# Patient Record
Sex: FEMALE | Race: WHITE | Employment: FULL TIME | URBAN - METROPOLITAN AREA
[De-identification: names, ages, dates, MRNs, and addresses within clinical notes are randomized per-mention and may not be internally consistent; named-entity substitution may affect disease eponyms.]

---

## 2022-11-10 ENCOUNTER — APPOINTMENT (OUTPATIENT)
Dept: CT IMAGING | Age: 27
End: 2022-11-10
Payer: MEDICAID

## 2022-11-10 ENCOUNTER — APPOINTMENT (OUTPATIENT)
Dept: ULTRASOUND IMAGING | Age: 27
End: 2022-11-10
Payer: MEDICAID

## 2022-11-10 ENCOUNTER — APPOINTMENT (OUTPATIENT)
Dept: GENERAL RADIOLOGY | Age: 27
End: 2022-11-10
Payer: MEDICAID

## 2022-11-10 ENCOUNTER — HOSPITAL ENCOUNTER (OUTPATIENT)
Age: 27
Setting detail: OBSERVATION
Discharge: HOME | End: 2022-11-11
Attending: EMERGENCY MEDICINE | Admitting: STUDENT IN AN ORGANIZED HEALTH CARE EDUCATION/TRAINING PROGRAM
Payer: MEDICAID

## 2022-11-10 DIAGNOSIS — I26.99 ACUTE PULMONARY EMBOLISM, UNSPECIFIED PULMONARY EMBOLISM TYPE, UNSPECIFIED WHETHER ACUTE COR PULMONALE PRESENT (HCC): Primary | ICD-10-CM

## 2022-11-10 LAB
ALBUMIN SERPL-MCNC: 4.4 G/DL (ref 3.5–5.2)
ALP BLD-CCNC: 44 U/L (ref 35–104)
ALT SERPL-CCNC: 10 U/L (ref 0–32)
ANION GAP SERPL CALCULATED.3IONS-SCNC: 10 MMOL/L (ref 7–16)
AST SERPL-CCNC: 18 U/L (ref 0–31)
BASOPHILS ABSOLUTE: 0.03 E9/L (ref 0–0.2)
BASOPHILS RELATIVE PERCENT: 0.4 % (ref 0–2)
BILIRUB SERPL-MCNC: 0.5 MG/DL (ref 0–1.2)
BUN BLDV-MCNC: 11 MG/DL (ref 6–20)
CALCIUM SERPL-MCNC: 9.2 MG/DL (ref 8.6–10.2)
CHLORIDE BLD-SCNC: 104 MMOL/L (ref 98–107)
CO2: 23 MMOL/L (ref 22–29)
CREAT SERPL-MCNC: 0.8 MG/DL (ref 0.5–1)
D DIMER: 314 NG/ML DDU
EKG ATRIAL RATE: 76 BPM
EKG P AXIS: 57 DEGREES
EKG P-R INTERVAL: 134 MS
EKG Q-T INTERVAL: 402 MS
EKG QRS DURATION: 90 MS
EKG QTC CALCULATION (BAZETT): 452 MS
EKG R AXIS: 37 DEGREES
EKG T AXIS: 33 DEGREES
EKG VENTRICULAR RATE: 76 BPM
EOSINOPHILS ABSOLUTE: 0.1 E9/L (ref 0.05–0.5)
EOSINOPHILS RELATIVE PERCENT: 1.4 % (ref 0–6)
GFR SERPL CREATININE-BSD FRML MDRD: >60 ML/MIN/1.73
GLUCOSE BLD-MCNC: 88 MG/DL (ref 74–99)
HCG, URINE, POC: NEGATIVE
HCT VFR BLD CALC: 40.4 % (ref 34–48)
HEMOGLOBIN: 13.4 G/DL (ref 11.5–15.5)
IMMATURE GRANULOCYTES #: 0.02 E9/L
IMMATURE GRANULOCYTES %: 0.3 % (ref 0–5)
LIPASE: 17 U/L (ref 13–60)
LYMPHOCYTES ABSOLUTE: 1.83 E9/L (ref 1.5–4)
LYMPHOCYTES RELATIVE PERCENT: 24.9 % (ref 20–42)
Lab: NORMAL
MCH RBC QN AUTO: 31.3 PG (ref 26–35)
MCHC RBC AUTO-ENTMCNC: 33.2 % (ref 32–34.5)
MCV RBC AUTO: 94.4 FL (ref 80–99.9)
MONOCYTES ABSOLUTE: 0.58 E9/L (ref 0.1–0.95)
MONOCYTES RELATIVE PERCENT: 7.9 % (ref 2–12)
NEGATIVE QC PASS/FAIL: NORMAL
NEUTROPHILS ABSOLUTE: 4.78 E9/L (ref 1.8–7.3)
NEUTROPHILS RELATIVE PERCENT: 65.1 % (ref 43–80)
PDW BLD-RTO: 12.4 FL (ref 11.5–15)
PLATELET # BLD: 262 E9/L (ref 130–450)
PMV BLD AUTO: 10.4 FL (ref 7–12)
POSITIVE QC PASS/FAIL: NORMAL
POTASSIUM SERPL-SCNC: 3.9 MMOL/L (ref 3.5–5)
RBC # BLD: 4.28 E12/L (ref 3.5–5.5)
SODIUM BLD-SCNC: 137 MMOL/L (ref 132–146)
TOTAL PROTEIN: 6.7 G/DL (ref 6.4–8.3)
TROPONIN, HIGH SENSITIVITY: <6 NG/L (ref 0–9)
WBC # BLD: 7.3 E9/L (ref 4.5–11.5)

## 2022-11-10 PROCEDURE — 71275 CT ANGIOGRAPHY CHEST: CPT

## 2022-11-10 PROCEDURE — 96372 THER/PROPH/DIAG INJ SC/IM: CPT

## 2022-11-10 PROCEDURE — 85378 FIBRIN DEGRADE SEMIQUANT: CPT

## 2022-11-10 PROCEDURE — 6360000002 HC RX W HCPCS: Performed by: NURSE PRACTITIONER

## 2022-11-10 PROCEDURE — 6360000002 HC RX W HCPCS: Performed by: STUDENT IN AN ORGANIZED HEALTH CARE EDUCATION/TRAINING PROGRAM

## 2022-11-10 PROCEDURE — 93010 ELECTROCARDIOGRAM REPORT: CPT | Performed by: INTERNAL MEDICINE

## 2022-11-10 PROCEDURE — G0378 HOSPITAL OBSERVATION PER HR: HCPCS

## 2022-11-10 PROCEDURE — 99285 EMERGENCY DEPT VISIT HI MDM: CPT

## 2022-11-10 PROCEDURE — 93970 EXTREMITY STUDY: CPT

## 2022-11-10 PROCEDURE — 99219 PR INITIAL OBSERVATION CARE/DAY 50 MINUTES: CPT | Performed by: NURSE PRACTITIONER

## 2022-11-10 PROCEDURE — 84484 ASSAY OF TROPONIN QUANT: CPT

## 2022-11-10 PROCEDURE — 71045 X-RAY EXAM CHEST 1 VIEW: CPT

## 2022-11-10 PROCEDURE — 83690 ASSAY OF LIPASE: CPT

## 2022-11-10 PROCEDURE — 93005 ELECTROCARDIOGRAM TRACING: CPT | Performed by: EMERGENCY MEDICINE

## 2022-11-10 PROCEDURE — 2580000003 HC RX 258: Performed by: NURSE PRACTITIONER

## 2022-11-10 PROCEDURE — 85025 COMPLETE CBC W/AUTO DIFF WBC: CPT

## 2022-11-10 PROCEDURE — 80053 COMPREHEN METABOLIC PANEL: CPT

## 2022-11-10 PROCEDURE — 6360000004 HC RX CONTRAST MEDICATION: Performed by: RADIOLOGY

## 2022-11-10 RX ORDER — ENOXAPARIN SODIUM 100 MG/ML
1 INJECTION SUBCUTANEOUS ONCE
Status: COMPLETED | OUTPATIENT
Start: 2022-11-10 | End: 2022-11-10

## 2022-11-10 RX ORDER — SODIUM CHLORIDE 9 MG/ML
INJECTION, SOLUTION INTRAVENOUS PRN
Status: DISCONTINUED | OUTPATIENT
Start: 2022-11-10 | End: 2022-11-11 | Stop reason: HOSPADM

## 2022-11-10 RX ORDER — SODIUM CHLORIDE 0.9 % (FLUSH) 0.9 %
5-40 SYRINGE (ML) INJECTION PRN
Status: DISCONTINUED | OUTPATIENT
Start: 2022-11-10 | End: 2022-11-11 | Stop reason: HOSPADM

## 2022-11-10 RX ORDER — ACETAMINOPHEN 650 MG/1
650 SUPPOSITORY RECTAL EVERY 6 HOURS PRN
Status: DISCONTINUED | OUTPATIENT
Start: 2022-11-10 | End: 2022-11-11 | Stop reason: HOSPADM

## 2022-11-10 RX ORDER — ONDANSETRON 2 MG/ML
4 INJECTION INTRAMUSCULAR; INTRAVENOUS EVERY 6 HOURS PRN
Status: DISCONTINUED | OUTPATIENT
Start: 2022-11-10 | End: 2022-11-11 | Stop reason: HOSPADM

## 2022-11-10 RX ORDER — ONDANSETRON 4 MG/1
4 TABLET, ORALLY DISINTEGRATING ORAL EVERY 8 HOURS PRN
Status: DISCONTINUED | OUTPATIENT
Start: 2022-11-10 | End: 2022-11-11 | Stop reason: HOSPADM

## 2022-11-10 RX ORDER — SODIUM CHLORIDE 0.9 % (FLUSH) 0.9 %
5-40 SYRINGE (ML) INJECTION EVERY 12 HOURS SCHEDULED
Status: DISCONTINUED | OUTPATIENT
Start: 2022-11-10 | End: 2022-11-11 | Stop reason: HOSPADM

## 2022-11-10 RX ORDER — POLYETHYLENE GLYCOL 3350 17 G/17G
17 POWDER, FOR SOLUTION ORAL DAILY PRN
Status: DISCONTINUED | OUTPATIENT
Start: 2022-11-10 | End: 2022-11-11 | Stop reason: HOSPADM

## 2022-11-10 RX ORDER — ENOXAPARIN SODIUM 100 MG/ML
1 INJECTION SUBCUTANEOUS 2 TIMES DAILY
Status: DISCONTINUED | OUTPATIENT
Start: 2022-11-10 | End: 2022-11-11

## 2022-11-10 RX ORDER — ACETAMINOPHEN 325 MG/1
650 TABLET ORAL EVERY 6 HOURS PRN
Status: DISCONTINUED | OUTPATIENT
Start: 2022-11-10 | End: 2022-11-11 | Stop reason: HOSPADM

## 2022-11-10 RX ADMIN — ENOXAPARIN SODIUM 80 MG: 100 INJECTION SUBCUTANEOUS at 14:47

## 2022-11-10 RX ADMIN — SODIUM CHLORIDE, PRESERVATIVE FREE 10 ML: 5 INJECTION INTRAVENOUS at 21:02

## 2022-11-10 RX ADMIN — IOPAMIDOL 75 ML: 755 INJECTION, SOLUTION INTRAVENOUS at 11:56

## 2022-11-10 RX ADMIN — ENOXAPARIN SODIUM 80 MG: 100 INJECTION SUBCUTANEOUS at 23:09

## 2022-11-10 ASSESSMENT — PAIN DESCRIPTION - ONSET: ONSET: PROGRESSIVE

## 2022-11-10 ASSESSMENT — PAIN - FUNCTIONAL ASSESSMENT
PAIN_FUNCTIONAL_ASSESSMENT: NONE - DENIES PAIN
PAIN_FUNCTIONAL_ASSESSMENT: 0-10
PAIN_FUNCTIONAL_ASSESSMENT: 0-10
PAIN_FUNCTIONAL_ASSESSMENT: PREVENTS OR INTERFERES WITH MANY ACTIVE NOT PASSIVE ACTIVITIES
PAIN_FUNCTIONAL_ASSESSMENT: NONE - DENIES PAIN
PAIN_FUNCTIONAL_ASSESSMENT: NONE - DENIES PAIN

## 2022-11-10 ASSESSMENT — PAIN DESCRIPTION - DESCRIPTORS: DESCRIPTORS: STABBING;SHARP

## 2022-11-10 ASSESSMENT — PAIN SCALES - GENERAL
PAINLEVEL_OUTOF10: 6
PAINLEVEL_OUTOF10: 0

## 2022-11-10 ASSESSMENT — ENCOUNTER SYMPTOMS
SHORTNESS OF BREATH: 1
ABDOMINAL PAIN: 0
BACK PAIN: 0

## 2022-11-10 ASSESSMENT — LIFESTYLE VARIABLES
HOW MANY STANDARD DRINKS CONTAINING ALCOHOL DO YOU HAVE ON A TYPICAL DAY: PATIENT DOES NOT DRINK
HOW OFTEN DO YOU HAVE A DRINK CONTAINING ALCOHOL: NEVER

## 2022-11-10 ASSESSMENT — PAIN DESCRIPTION - FREQUENCY: FREQUENCY: INTERMITTENT

## 2022-11-10 ASSESSMENT — PAIN DESCRIPTION - LOCATION: LOCATION: CHEST

## 2022-11-10 ASSESSMENT — PAIN DESCRIPTION - PAIN TYPE: TYPE: ACUTE PAIN

## 2022-11-10 NOTE — H&P
[facility]   History and Physical    CHIEF COMPLAINT: Palpitations, fatigue    History of Present Illness:  Elvira Zhang is a 27 y.o. female with a history of anxiety & low back pain who presents with Chest Pain, Dizziness, and Shortness of Breath  Patient states she has had intermittent palpitations for the last year. States it has been worked up, & she was told she has an 'arrhythmia'. Patient states palpitations have increased in frequency over the last several days & was severe today. She has also experienced excessive fatigue, peripheral edema & left lateral chest wall pain that worsens with a deep breath over the last few days. Patient denies recent travel or prolonged inactivity. Drove to [place] in early October, but states that he made frequent stops to stretch. States she is very active, works out daily. Has not been on any type of birth control since 12yo. Does not smoke cigarettes. States she uses medical marijuana very infrequently for anxiety, most recently 2-3 weeks ago. Denies history of clotting. Denies knowledge of family history of clotting. Does have a significant family history of cancers. Denies recent illness or injury. Denies CP, SOB. Pertinent ER findings include elevated D-dimer (314). CTA identified focal filling defect in distal of RUL compatible with acute PE. CXR negative for acute process. US bilat LE's pending. Urine pregnant negative. While in ER, patient received Lovenox 80 mg IM.     WORK UP SINCE ARRIVAL:  Results for orders placed or performed during the hospital encounter of 11/10/22   CMP   Result Value Ref Range    Sodium 137 132 - 146 mmol/L    Potassium 3.9 3.5 - 5.0 mmol/L    Chloride 104 98 - 107 mmol/L    CO2 23 22 - 29 mmol/L    Anion Gap 10 7 - 16 mmol/L    Glucose 88 74 - 99 mg/dL    BUN 11 6 - 20 mg/dL    Creatinine 0.8 0.5 - 1.0 mg/dL    Est, Glom Filt Rate >60 >=60 mL/min/1.73    Calcium 9.2 8.6 - 10.2 mg/dL Total Protein 6.7 6.4 - 8.3 g/dL    Albumin 4.4 3.5 - 5.2 g/dL    Total Bilirubin 0.5 0.0 - 1.2 mg/dL    Alkaline Phosphatase 44 35 - 104 U/L    ALT 10 0 - 32 U/L    AST 18 0 - 31 U/L   CBC with Auto Differential   Result Value Ref Range    WBC 7.3 4.5 - 11.5 E9/L    RBC 4.28 3.50 - 5.50 E12/L    Hemoglobin 13.4 11.5 - 15.5 g/dL    Hematocrit 40.4 34.0 - 48.0 %    MCV 94.4 80.0 - 99.9 fL    MCH 31.3 26.0 - 35.0 pg    MCHC 33.2 32.0 - 34.5 %    RDW 12.4 11.5 - 15.0 fL    Platelets 047 896 - 716 E9/L    MPV 10.4 7.0 - 12.0 fL    Neutrophils % 65.1 43.0 - 80.0 %    Immature Granulocytes % 0.3 0.0 - 5.0 %    Lymphocytes % 24.9 20.0 - 42.0 %    Monocytes % 7.9 2.0 - 12.0 %    Eosinophils % 1.4 0.0 - 6.0 %    Basophils % 0.4 0.0 - 2.0 %    Neutrophils Absolute 4.78 1.80 - 7.30 E9/L    Immature Granulocytes # 0.02 E9/L    Lymphocytes Absolute 1.83 1.50 - 4.00 E9/L    Monocytes Absolute 0.58 0.10 - 0.95 E9/L    Eosinophils Absolute 0.10 0.05 - 0.50 E9/L    Basophils Absolute 0.03 0.00 - 0.20 E9/L   Troponin   Result Value Ref Range    Troponin, High Sensitivity <6 0 - 9 ng/L   Lipase   Result Value Ref Range    Lipase 17 13 - 60 U/L   D-Dimer, Quantitative   Result Value Ref Range    D-Dimer, Quant 314 ng/mL DDU   POC Pregnancy Urine Qual   Result Value Ref Range    HCG, Urine, POC Negative Negative    Lot Number [id number]     Positive QC Pass/Fail Pass     Negative QC Pass/Fail Pass    EKG 12 Lead   Result Value Ref Range    Ventricular Rate 76 BPM    Atrial Rate 76 BPM    P-R Interval 134 ms    QRS Duration 90 ms    Q-T Interval 402 ms    QTc Calculation (Bazett) 452 ms    P Axis 57 degrees    R Axis 37 degrees    T Axis 33 degrees     CTA PULMONARY W CONTRAST   Final Result by Gabby Vigil MD (11/10 5125)   1. Focal filling defect in the distal right upper lobe anterior segmental   artery compatible an acute pulmonary embolus. 2.  No aneurysm formation or dissection the thoracic aorta.       3.  No acute pulmonary process. Preliminary report given to Dr. Nikhil Nguyễn physician in [facility] at the time   of the interpretation. RECOMMENDATIONS:   Unavailable         XR CHEST PORTABLE   Final Result by Darwin Wakefield MD (11/10 1016)   No active cardiopulmonary disease. US DUP LOWER EXTREMITIES BILATERAL VENOUS    (Results Pending)       REVIEW OF SYSTEMS:  no fevers, chills, cp, sob, n/v, ha, vision/hearing changes, wt changes, hot/cold flashes, other open skin lesions, diarrhea, constipation, dysuria/hematuria unless noted in HPI. Complete ROS performed with the patient and is otherwise negative. ALLERGIES:  Patient has no known allergies. PAST MEDICAL & SURGICAL HISTORY:  Pt  has a past medical history of Anxiety and Low back pain. .   Pt has no surgical history    Social History:  Pt  reports that she has never smoked. She has never used smokeless tobacco. She reports current alcohol use of about 1.0 standard drink per week. She reports that she does not currently use drugs after having used the following drugs: Marijuana Kenard Snooks). .       Family History:  Pt family history includes Cancer in her mother; High Blood Pressure in her mother; No Known Problems in her father and sister. States her father has been very ill for the last 2 years s/p cholecystectomy, but no medical diagnosis. Informant(s) for H&P: Patient, chart review    Pt  height is 5' 7\" (1.702 m) and weight is 170 lb (77.1 kg). Her tympanic temperature is 97.7 °F (36.5 °C). Her blood pressure is 114/66 and her pulse is 58. Her respiration is 18 and oxygen saturation is 98%. .   Body mass index is 26.63 kg/m². Pt's home meds include medical marijuana    PHYSICAL EXAM:  General Appearance: Awake, alert and oriented.  In no acute distress  Skin: Warm and dry, no rash or erythema  Head: normocephalic and atraumatic  Eyes: pupils equal, round, and reactive to light, extraocular eye movements intact, conjunctivae normal  ENT: external ear and ear canal normal bilaterally, nose without deformity  Neck: supple and non-tender without mass, no cervical lymphadenopathy  Pulmonary/Chest: clear to auscultation bilaterally- no wheezes, rales or rhonchi. Normal air movement. No respiratory distress. Cardiovascular: normal rate, regular rhythm. Normal S1 and S2. No murmurs, rubs, clicks, or gallops  Abdomen: soft, non-tender, non-distended. Normal bowel sounds. No masses or organomegaly  Extremities: no cyanosis, clubbing or edema  Musculoskeletal: normal range of motion, no joint swelling, deformity or tenderness  Neurologic: reflexes normal and symmetric, no cranial nerve deficit, gait, coordination and speech normal      /66   Pulse 58   Temp 97.7 °F (36.5 °C) (Tympanic)   Resp 18   Ht 5' 7\" (1.702 m)   Wt 170 lb (77.1 kg)   LMP 10/13/2022 (Exact Date)   SpO2 98%   BMI 26.63 kg/m²     Recent Labs     11/10/22  0936      K 3.9      CO2 23   BUN 11   CREATININE 0.8   GLUCOSE 88   CALCIUM 9.2       Recent Labs     11/10/22  0936   WBC 7.3   RBC 4.28   HGB 13.4   HCT 40.4   MCV 94.4   MCH 31.3   MCHC 33.2   RDW 12.4      MPV 10.4       No results for input(s): POCGLU in the last 72 hours. Radiology: XR CHEST PORTABLE    Result Date: 11/10/2022  No active cardiopulmonary disease. CTA PULMONARY W CONTRAST    Result Date: 11/10/2022  1. Focal filling defect in the distal right upper lobe anterior segmental artery compatible an acute pulmonary embolus. 2.  No aneurysm formation or dissection the thoracic aorta. 3.  No acute pulmonary process. Preliminary report given to Dr. Naomie Covarrubias physician in [facility] at the time of the interpretation.  RECOMMENDATIONS: Unavailable        EKG Narrative & Impression    Normal sinus rhythm  Normal ECG  No previous ECGs available  Confirmed by Chet Halsted ([id number]) on 11/10/2022 12:48:13 PM       Assessment & Plan:    Pulmonary embolism right lung  -Weight-based Lovenox injections BID  -Thrombophilia panel  -Pulmonology consulted  -US cresencio LE's pending  -Continuous telemetry  -Oxygen therapy protocol    Anxiety  -Pt states she controls anxiety through medication, breathing exercises, etc  -Had significantly reduced use of medical marijuana, most recent use 2-3 weeks ago  -monitor    Code Status: Full code  DVT prophylaxis: Weight-based Lovenox injections    45 minutes or more spent reviewing patient chart, assessing patient, discussing plan of care with patient and family, discussing plan of care with collaborating physician, and documentation. NOTE: This report was transcribed using voice recognition software. Every effort was made to ensure accuracy; however, inadvertent computerized transcription errors may be present.      Electronically signed by CASEY Escalante NP on 11/10/2022 at 3:29 PM

## 2022-11-10 NOTE — PROGRESS NOTES
Database initiated pharmacy and medications verified with the patient. She is A&O independent from home.

## 2022-11-10 NOTE — ED PROVIDER NOTES
This is a 55-year-old female with no significant past medical history who presents to the ED for evaluation of chest pain. Patient states for the past week she has been having palpitations which seem to occur at rest.  They seem to come and go. Over the last 24 hours or so and worse today the patient developed some chest pain going to her back with associated palpitations and shortness of breath which prompted her to come to the ER for further evaluation. The pain seemed to improve however is still persistent. Patient has no fevers chills or cough. No use of birth control or smoking. No recent travel no recent surgeries no leg pain or leg swelling. The history is provided by the patient. Review of Systems   Constitutional:  Negative for fever. HENT:  Negative for congestion. Eyes:  Negative for visual disturbance. Respiratory:  Positive for shortness of breath. Cardiovascular:  Negative for chest pain. Gastrointestinal:  Negative for abdominal pain. Endocrine: Negative for polyuria. Genitourinary:  Negative for dysuria. Musculoskeletal:  Negative for back pain. Skin:  Negative for rash. Allergic/Immunologic: Negative for immunocompromised state. Neurological:  Negative for headaches. Hematological:  Does not bruise/bleed easily. Psychiatric/Behavioral:  Negative for confusion. Physical Exam  Vitals and nursing note reviewed. Constitutional:       General: She is not in acute distress. Appearance: She is well-developed. HENT:      Head: Normocephalic and atraumatic. Mouth/Throat:      Mouth: Mucous membranes are moist.   Eyes:      Extraocular Movements: Extraocular movements intact. Neck:      Vascular: No JVD. Cardiovascular:      Rate and Rhythm: Normal rate and regular rhythm. Pulmonary:      Effort: Pulmonary effort is normal.   Abdominal:      General: There is no distension. Palpations: Abdomen is soft. Tenderness:  There is no abdominal tenderness. There is no right CVA tenderness, guarding or rebound. Hernia: No hernia is present. Musculoskeletal:      Cervical back: Normal range of motion and neck supple. Right lower leg: No edema. Left lower leg: No edema. Skin:     General: Skin is warm and dry. Capillary Refill: Capillary refill takes less than 2 seconds. Neurological:      General: No focal deficit present. Mental Status: She is alert and oriented to person, place, and time. Cranial Nerves: No cranial nerve deficit. Psychiatric:         Mood and Affect: Mood normal.         Behavior: Behavior normal.        Procedures     MDM  Number of Diagnoses or Management Options  Acute pulmonary embolism, unspecified pulmonary embolism type, unspecified whether acute cor pulmonale present [facility])  Diagnosis management comments: Patient presented to the ED for evaluation of palpitations ongoing for 1 week over the past days she has been having some chest pain into her back with associated shortness of breath. Patient was found to have a segmental pulmonary embolism on the right side patient has no risk factors for PE she was treated with Lovenox was hemodynamically stable she was admitted to the medicine team for further evaluation                     --------------------------------------------- PAST HISTORY ---------------------------------------------  Past Medical History:  has a past medical history of Anxiety and Low back pain. Past Surgical History:  has no past surgical history on file. Social History:  reports that she has never smoked. She has never used smokeless tobacco. She reports current alcohol use of about 1.0 standard drink per week. She reports that she does not currently use drugs after having used the following drugs: Marijuana Kristina Darshan). Family History: family history includes Cancer in her mother; High Blood Pressure in her mother; No Known Problems in her father and sister. The patients home medications have been reviewed. Allergies: Patient has no known allergies.     -------------------------------------------------- RESULTS -------------------------------------------------    LABS:  Results for orders placed or performed during the hospital encounter of 11/10/22   CMP   Result Value Ref Range    Sodium 137 132 - 146 mmol/L    Potassium 3.9 3.5 - 5.0 mmol/L    Chloride 104 98 - 107 mmol/L    CO2 23 22 - 29 mmol/L    Anion Gap 10 7 - 16 mmol/L    Glucose 88 74 - 99 mg/dL    BUN 11 6 - 20 mg/dL    Creatinine 0.8 0.5 - 1.0 mg/dL    Est, Glom Filt Rate >60 >=60 mL/min/1.73    Calcium 9.2 8.6 - 10.2 mg/dL    Total Protein 6.7 6.4 - 8.3 g/dL    Albumin 4.4 3.5 - 5.2 g/dL    Total Bilirubin 0.5 0.0 - 1.2 mg/dL    Alkaline Phosphatase 44 35 - 104 U/L    ALT 10 0 - 32 U/L    AST 18 0 - 31 U/L   CBC with Auto Differential   Result Value Ref Range    WBC 7.3 4.5 - 11.5 E9/L    RBC 4.28 3.50 - 5.50 E12/L    Hemoglobin 13.4 11.5 - 15.5 g/dL    Hematocrit 40.4 34.0 - 48.0 %    MCV 94.4 80.0 - 99.9 fL    MCH 31.3 26.0 - 35.0 pg    MCHC 33.2 32.0 - 34.5 %    RDW 12.4 11.5 - 15.0 fL    Platelets 170 831 - 438 E9/L    MPV 10.4 7.0 - 12.0 fL    Neutrophils % 65.1 43.0 - 80.0 %    Immature Granulocytes % 0.3 0.0 - 5.0 %    Lymphocytes % 24.9 20.0 - 42.0 %    Monocytes % 7.9 2.0 - 12.0 %    Eosinophils % 1.4 0.0 - 6.0 %    Basophils % 0.4 0.0 - 2.0 %    Neutrophils Absolute 4.78 1.80 - 7.30 E9/L    Immature Granulocytes # 0.02 E9/L    Lymphocytes Absolute 1.83 1.50 - 4.00 E9/L    Monocytes Absolute 0.58 0.10 - 0.95 E9/L    Eosinophils Absolute 0.10 0.05 - 0.50 E9/L    Basophils Absolute 0.03 0.00 - 0.20 E9/L   Troponin   Result Value Ref Range    Troponin, High Sensitivity <6 0 - 9 ng/L   Lipase   Result Value Ref Range    Lipase 17 13 - 60 U/L   D-Dimer, Quantitative   Result Value Ref Range    D-Dimer, Quant 314 ng/mL DDU   POC Pregnancy Urine Qual   Result Value Ref Range    HCG, Urine, POC Negative Negative    Lot Number [id number]     Positive QC Pass/Fail Pass     Negative QC Pass/Fail Pass    EKG 12 Lead   Result Value Ref Range    Ventricular Rate 76 BPM    Atrial Rate 76 BPM    P-R Interval 134 ms    QRS Duration 90 ms    Q-T Interval 402 ms    QTc Calculation (Bazett) 452 ms    P Axis 57 degrees    R Axis 37 degrees    T Axis 33 degrees     EKG: This EKG is signed and interpreted by me. Rate: 76  Rhythm: Sinus  Interpretation: no acute changes, no st elevation, no t wave inversions, no st depressions, normal axis  Comparison: no previous EKG     RADIOLOGY:  US DUP LOWER EXTREMITIES BILATERAL VENOUS   Final Result   No evidence of DVT in either lower extremity. RECOMMENDATIONS:   Unavailable         CTA PULMONARY W CONTRAST   Final Result   1. Focal filling defect in the distal right upper lobe anterior segmental   artery compatible an acute pulmonary embolus. 2.  No aneurysm formation or dissection the thoracic aorta. 3.  No acute pulmonary process. Preliminary report given to Dr. Tmi Vázquez physician in [facility] at the time   of the interpretation. RECOMMENDATIONS:   Unavailable         XR CHEST PORTABLE   Final Result   No active cardiopulmonary disease.                   ------------------------- NURSING NOTES AND VITALS REVIEWED ---------------------------  Date / Time Roomed:  11/10/2022  9:10 AM  ED Bed Assignment:  08/08    The nursing notes within the ED encounter and vital signs as below have been reviewed.      Patient Vitals for the past 24 hrs:   BP Temp Temp src Pulse Resp SpO2 Height Weight   11/10/22 1612 (!) 104/58 -- -- 74 18 95 % -- --   11/10/22 1223 114/66 -- -- 58 18 98 % -- --   11/10/22 1109 112/64 -- -- 58 18 100 % -- --   11/10/22 0843 117/89 97.7 °F (36.5 °C) Tympanic 62 16 100 % 5' 7\" (1.702 m) 170 lb (77.1 kg)       Oxygen Saturation Interpretation: Normal    ------------------------------------------ PROGRESS NOTES ------------------------------------------  Re-evaluation(s):  Time: 12  Patients symptoms are improving  Repeat physical examination is improved    Counseling:  I have spoken with the patient and discussed todays results, in addition to providing specific details for the plan of care and counseling regarding the diagnosis and prognosis. Their questions are answered at this time and they are agreeable with the plan of admission.    --------------------------------- ADDITIONAL PROVIDER NOTES ---------------------------------  Consultations:  Spoke with Dr. Bandar Raza Discussed case. They will admit the patient. This patient's ED course included: a personal history and physicial examination, re-evaluation prior to disposition, multiple bedside re-evaluations, IV medications, cardiac monitoring, continuous pulse oximetry, and complex medical decision making and emergency management    This patient has remained hemodynamically stable during their ED course. Please note that the withdrawal or failure to initiate urgent interventions for this patient would likely result in a life threatening deterioration or permanent disability. Systems at risk for deterioration include: Cv/Pulm    Accordingly this patient received 31 minutes of critical care time, excluding separately billable procedures. Diagnosis:  1. Acute pulmonary embolism, unspecified pulmonary embolism type, unspecified whether acute cor pulmonale present (HCC)        Disposition:  Patient's disposition: Admit to telemetry  Patient's condition is stable.          Feng Brooke DO  11/11/22 1141

## 2022-11-11 VITALS
WEIGHT: 170 LBS | HEART RATE: 70 BPM | BODY MASS INDEX: 26.68 KG/M2 | DIASTOLIC BLOOD PRESSURE: 65 MMHG | TEMPERATURE: 98.2 F | OXYGEN SATURATION: 98 % | SYSTOLIC BLOOD PRESSURE: 114 MMHG | RESPIRATION RATE: 18 BRPM | HEIGHT: 67 IN

## 2022-11-11 LAB
ANION GAP SERPL CALCULATED.3IONS-SCNC: 9 MMOL/L (ref 7–16)
BASOPHILS ABSOLUTE: 0.04 E9/L (ref 0–0.2)
BASOPHILS RELATIVE PERCENT: 0.7 % (ref 0–2)
BUN BLDV-MCNC: 15 MG/DL (ref 6–20)
CALCIUM SERPL-MCNC: 9 MG/DL (ref 8.6–10.2)
CHLORIDE BLD-SCNC: 105 MMOL/L (ref 98–107)
CO2: 23 MMOL/L (ref 22–29)
CREAT SERPL-MCNC: 0.8 MG/DL (ref 0.5–1)
EOSINOPHILS ABSOLUTE: 0.17 E9/L (ref 0.05–0.5)
EOSINOPHILS RELATIVE PERCENT: 2.9 % (ref 0–6)
GFR SERPL CREATININE-BSD FRML MDRD: >60 ML/MIN/1.73
GLUCOSE BLD-MCNC: 114 MG/DL (ref 74–99)
HCT VFR BLD CALC: 38.9 % (ref 34–48)
HEMOGLOBIN: 12.9 G/DL (ref 11.5–15.5)
IMMATURE GRANULOCYTES #: 0.01 E9/L
IMMATURE GRANULOCYTES %: 0.2 % (ref 0–5)
LYMPHOCYTES ABSOLUTE: 2.05 E9/L (ref 1.5–4)
LYMPHOCYTES RELATIVE PERCENT: 35.2 % (ref 20–42)
MCH RBC QN AUTO: 31.2 PG (ref 26–35)
MCHC RBC AUTO-ENTMCNC: 33.2 % (ref 32–34.5)
MCV RBC AUTO: 94 FL (ref 80–99.9)
MONOCYTES ABSOLUTE: 0.62 E9/L (ref 0.1–0.95)
MONOCYTES RELATIVE PERCENT: 10.6 % (ref 2–12)
NEUTROPHILS ABSOLUTE: 2.94 E9/L (ref 1.8–7.3)
NEUTROPHILS RELATIVE PERCENT: 50.4 % (ref 43–80)
PDW BLD-RTO: 12.5 FL (ref 11.5–15)
PLATELET # BLD: 257 E9/L (ref 130–450)
PMV BLD AUTO: 10 FL (ref 7–12)
POTASSIUM REFLEX MAGNESIUM: 3.9 MMOL/L (ref 3.5–5)
RBC # BLD: 4.14 E12/L (ref 3.5–5.5)
SODIUM BLD-SCNC: 137 MMOL/L (ref 132–146)
T4 FREE: 0.96 NG/DL (ref 0.93–1.7)
TSH SERPL DL<=0.05 MIU/L-ACNC: 4.33 UIU/ML (ref 0.27–4.2)
WBC # BLD: 5.8 E9/L (ref 4.5–11.5)

## 2022-11-11 PROCEDURE — 80048 BASIC METABOLIC PNL TOTAL CA: CPT

## 2022-11-11 PROCEDURE — 2580000003 HC RX 258: Performed by: NURSE PRACTITIONER

## 2022-11-11 PROCEDURE — 84443 ASSAY THYROID STIM HORMONE: CPT

## 2022-11-11 PROCEDURE — 96372 THER/PROPH/DIAG INJ SC/IM: CPT

## 2022-11-11 PROCEDURE — 81241 F5 GENE: CPT

## 2022-11-11 PROCEDURE — 81291 MTHFR GENE: CPT

## 2022-11-11 PROCEDURE — 81240 F2 GENE: CPT

## 2022-11-11 PROCEDURE — 84439 ASSAY OF FREE THYROXINE: CPT

## 2022-11-11 PROCEDURE — G0378 HOSPITAL OBSERVATION PER HR: HCPCS

## 2022-11-11 PROCEDURE — 36415 COLL VENOUS BLD VENIPUNCTURE: CPT

## 2022-11-11 PROCEDURE — 99217 PR OBSERVATION CARE DISCHARGE MANAGEMENT: CPT | Performed by: NURSE PRACTITIONER

## 2022-11-11 PROCEDURE — 81400 MOPATH PROCEDURE LEVEL 1: CPT

## 2022-11-11 PROCEDURE — 85025 COMPLETE CBC W/AUTO DIFF WBC: CPT

## 2022-11-11 PROCEDURE — 6360000002 HC RX W HCPCS: Performed by: NURSE PRACTITIONER

## 2022-11-11 RX ADMIN — SODIUM CHLORIDE, PRESERVATIVE FREE 10 ML: 5 INJECTION INTRAVENOUS at 08:38

## 2022-11-11 RX ADMIN — ENOXAPARIN SODIUM 80 MG: 100 INJECTION SUBCUTANEOUS at 08:35

## 2022-11-11 NOTE — DISCHARGE INSTRUCTIONS
You were started on a new medication calledELIQUIS and given a discount card. Please be sure to follow up with your physician soon after discharge so that this medication can be continued without interruption. Pre certification may be required with your insurance company.

## 2022-11-11 NOTE — CARE COORDINATION
Pt independent from home with spouse; has PCP in [place]; cannot recall name. Pt to d/c on Eliquis ; 30 day free card given with instruction. Pt advised to follow up immediately with PCP post  discharge. Has transport home. Lolis Still.

## 2022-11-11 NOTE — CONSULTS
Gena Watson M.D.,[facility]  Georgia Pedraza D.O., F.SOLOMON., Germain Romero M.D. Aurora Goldman M.D. Iván Alvarez D.O. Patient:  Delonte West 27 y.o. female MRN: [id number]     Date of Service: 11/11/2022      PULMONARY CONSULTATION    Reason for Consultation: pulmonary embolism R lung  Referring Physician: Yudi WOOD    Communication with the referring physician will be sent via the electronic medical record. Chief Complaint: shortness of breath    CODE STATUS: full code    SUBJECTIVE:  HPI:  Delonte West is a 27 y.o. female with no past medical history and no previous pulmonary issues presented to the ED for evaluation of chest pain and shortness of breath. She said she has had intermittent palpitations for the past year but is on no medications. Two days ago, she started having palpitations again with associated shortness of breath. She was found to have a pulmonary embolus in the distal right upper lobe anterior segmental artery. She denies any past history of IV drug use, cancer, travel, family history, or miscarriage. She was treated with Lovenox. Today, she is seen and examined sitting in bed in no apparent distress. Lung sounds are clear and she denies cough, wheeze, shortness of breath, or chest pain. Past Medical History:   Diagnosis Date    Anxiety     Low back pain        History reviewed. No pertinent surgical history.     Family History   Problem Relation Age of Onset    High Blood Pressure Mother     Cancer Mother     No Known Problems Father     No Known Problems Sister        Social History:   Social History     Socioeconomic History    Marital status:      Spouse name: Not on file    Number of children: Not on file    Years of education: Not on file    Highest education level: Not on file   Occupational History    Not on file   Tobacco Use    Smoking status: Never    Smokeless tobacco: Never   Vaping Use    Vaping Use: Never used Substance and Sexual Activity    Alcohol use: Yes     Alcohol/week: 1.0 standard drink     Types: 1 Shots of liquor per week    Drug use: Not Currently     Types: Marijuana Harjinder Carolee)     Comment: medical marijuana-rarely uses    Sexual activity: Not on file   Other Topics Concern    Not on file   Social History Narrative    Not on file     Social Determinants of Health     Financial Resource Strain: Not on file   Food Insecurity: Not on file   Transportation Needs: Not on file   Physical Activity: Not on file   Stress: Not on file   Social Connections: Not on file   Intimate Partner Violence: Not on file   Housing Stability: Not on file     Smoking history: The patient is a Never smoker   ETOH:   reports current alcohol use of about 1.0 standard drink per week. Exposures: There  is not history of TB or TB exposure. There is not asbestos or silica dust exposure. The patient reports does not have coal, foundry, quarry or [employer] exposure. Recent travel history none. There is not  history of recreational or IV drug use. There is not hot tub exposure. The patient does not have any exotic pets, turtles or exotic birds. Vaccines:    Influenza:  Refused  Pneumococcal Polysaccharide:  Refused    There is no immunization history on file for this patient. Home Meds: Medications Prior to Admission: medical marijuana, Take by mouth as needed. CURRENT MEDS :  Scheduled Meds:   enoxaparin  1 mg/kg SubCUTAneous BID    sodium chloride flush  5-40 mL IntraVENous 2 times per day       Continuous Infusions:   sodium chloride         No Known Allergies    REVIEW OF SYSTEMS:  Constitutional: Denies fever, weight loss, night sweats, and fatigue  Skin: Denies pigmentation, dark lesions, and rashes   HEENT: Denies hearing loss, tinnitus, ear drainage, epistaxis, sore throat, and hoarseness. Cardiovascular: Denies palpitations, chest pain, and chest pressure.   Respiratory: Denies cough, dyspnea at rest, hemoptysis, apnea, and choking. Gastrointestinal: Denies nausea, vomiting, poor appetite, diarrhea, heartburn or reflux  Genitourinary: Denies dysuria, frequency, urgency or hematuria  Musculoskeletal: Denies myalgias, muscle weakness, and bone pain  Neurological: Denies dizziness, vertigo, headache, and focal weakness  Psychological: Denies anxiety and depression  Endocrine: Denies heat intolerance and cold intolerance  Hematopoietic/Lymphatic: Denies bleeding problems and blood transfusions    OBJECTIVE:   BP (!) 98/55   Pulse 65   Temp 98.2 °F (36.8 °C) (Oral)   Resp 18   Ht 5' 7\" (1.702 m)   Wt 170 lb (77.1 kg)   LMP 10/13/2022 (Exact Date)   SpO2 98%   BMI 26.63 kg/m²   SpO2 Readings from Last 1 Encounters:   11/11/22 98%        I/O:    Intake/Output Summary (Last 24 hours) at 11/11/2022 1155  Last data filed at 11/11/2022 0747  Gross per 24 hour   Intake 240 ml   Output --   Net 240 ml        Physical Exam:  General: The patient is lying in bed comfortably without any distress. Breathing is not labored  HEENT: Pupils are equal round and reactive to light, there are no oral lesions and no post-nasal drip   Neck: supple without adenopathy  Cardiovascular: regular rate and rhythm without murmur or gallop  Respiratory: Clear to auscultation bilaterally without wheezing or crackles. Air entry is symmetric  Abdomen: soft, non-tender, non-distended, normal bowel sounds  Extremities: warm, no edema, no clubbing  Skin: no rash or lesion  Neurologic: CN II-XII grossly intact, no focal deficits    Pulmonary Function Testing personally reviewed and interpreted  None on file    Imaging personally reviewed:  11/10/2022 CTA chest  1. Focal filling defect in the distal right upper lobe anterior segmental   artery compatible an acute pulmonary embolus. 2.  No aneurysm formation or dissection the thoracic aorta. 3.  No acute pulmonary process.      Echo:  None on file    Labs:  Lab Results   Component Value Date/Time WBC 5.8 11/11/2022 02:52 AM    HGB 12.9 11/11/2022 02:52 AM    HCT 38.9 11/11/2022 02:52 AM    MCV 94.0 11/11/2022 02:52 AM    MCH 31.2 11/11/2022 02:52 AM    MCHC 33.2 11/11/2022 02:52 AM    RDW 12.5 11/11/2022 02:52 AM     11/11/2022 02:52 AM    MPV 10.0 11/11/2022 02:52 AM     Lab Results   Component Value Date/Time     11/11/2022 02:52 AM    K 3.9 11/11/2022 02:52 AM     11/11/2022 02:52 AM    CO2 23 11/11/2022 02:52 AM    BUN 15 11/11/2022 02:52 AM    CREATININE 0.8 11/11/2022 02:52 AM    LABALBU 4.4 11/10/2022 09:36 AM    CALCIUM 9.0 11/11/2022 02:52 AM    LABGLOM >60 11/11/2022 02:52 AM     Assessment:  Pulmonary embolism possibly provoked from prolonged travel and covid  palpitations    Plan:  Start Eliquis x 3 months   Thrombophilia panel drawn in ED  Recommend follow up with cardiology      Thank you for allowing me to participate in the care of Adrian Licea. Please feel free to call with questions. This plan of care was reviewed in collaboration with Dr. Brien Warren    Electronically signed by CASEY Thapa CNP on 11/11/2022 at 11:55 AM      Note: This report was completed utilizing computer voice recognition software. Every effort has been made to ensure accuracy, however; inadvertent computerized transcription errors may be present    I personally saw, examined, and cared for the patient. Labs, medications, radiographs reviewed. I agree with history exam and plans detailed in NP note with the following additions:    Ms. Fabienne Mcgowan is a 27year old whom we are asked to evaluate for a small PE. She has a h/o car trip to [facility] about 4 weeks ago and prior to this had a covid infection. She does not wish to commit to indefinite anticoagulation.   I would recommend 3 months of treatment unless her thrombophilia panel is abnormal.    Follow up 2 weeks with NP and 3 months with me  Okay to discharge    Aleksandra Alaniz MD

## 2022-11-11 NOTE — DISCHARGE SUMMARY
[facility]   Discharge Summary      Raeann Kraus MD  [address] [facility]  [phone]    Schedule an appointment as soon as possible for a visit in 2 week(s)  2 weeks with NP and 3 months with me      Activity level: As tolerated    Diet: ADULT DIET; Regular    Labs: Per PCP    Condition at discharge: Stable    Dispo: Discharge home      Patient ID:  Christiano Manley  [id number]  27 y.o.  1995      Discharge date and time:  11/11/2022  3:06 PM    Admission Diagnoses: Principal Problem:    Pulmonary embolism on right [facility])  Resolved Problems:    * No resolved hospital problems. *      Discharge Diagnoses: Principal Problem:    Pulmonary embolism on right [facility])  Resolved Problems:    * No resolved hospital problems. *      Past Medical History:  has a past medical history of Anxiety and Low back pain. Consults:  IP CONSULT TO PULMONOLOGY    Procedures: N/A    Hospital Course:   Christiano Manley is a pleasant 31yo female who presented to ER on 11/10 for CP, SOB, & palpitations. Patient was noted to have RUL segmental pulmonary embolism. Seemingly unprovoked: Denies recent travel, immobilization, personal history of clotting, known family history of clotting disorders, or previous miscarriages. Does not smoke, has not used OCPs since she was a teenager. Patient was brought in for observation. She was started on therapeutic Lovenox, thrombophilia panel was obtained, and pulmonology was consulted. US bilat LE's negative for DVTs. Patient states she has been having palpitations for the last year. Patient admits to moderate caffeine consumption as well as consumption of various protein/nutritional supplements. EKG was normal.  Patient has been on continuous telemetry with no abnormalities noted to rate or rhythm. TSH was slightly elevated at 4.33, but free T4 normal at 0.96. Patient was advised to eliminate/reduce caffeine consumption.  She was also advised to stop all dietary supplements for a trial with possible incremental reintroduction. Patient has remained hemodynamically stable throughout her time here. At this time she is medically stable for discharge. She is to start 7-day loading dose of Eliquis tonight, followed by daily maintenance dose. Patient cannot recall the name of her out-of-town PCP, but has been made aware that she should follow-up with PCP immediately upon discharge. Discharge Exam:  Vitals:    11/10/22 1904 11/10/22 1952 11/11/22 0730 11/11/22 1135   BP: 109/65 115/63 (!) 98/55 114/65   Pulse: 76 70 65 70   Resp: 18 18 18 18   Temp: 99 °F (37.2 °C) 97.6 °F (36.4 °C) 98.2 °F (36.8 °C) 98.2 °F (36.8 °C)   TempSrc: Oral Oral Oral Oral   SpO2: 97% 97% 98% 98%   Weight:  170 lb (77.1 kg)     Height:  5' 7\" (1.702 m)         PHYSICAL EXAM  General Appearance: Awake, alert and oriented. In no acute distress  Skin: Warm and dry, no rash or erythema  Head: normocephalic and atraumatic  Eyes: pupils equal, round, and reactive to light, extraocular eye movements intact, conjunctivae normal  ENT: external ear and ear canal normal bilaterally, nose without deformity  Neck: supple and non-tender without mass, no cervical lymphadenopathy  Pulmonary/Chest: clear to auscultation bilaterally- no wheezes, rales or rhonchi. Normal air movement. No respiratory distress. Cardiovascular: normal rate, regular rhythm. Normal S1 and S2. No murmurs, rubs, clicks, or gallops  Abdomen: soft, non-tender, non-distended. Normal bowel sounds. No masses or organomegaly  Extremities: no cyanosis, clubbing or edema  Musculoskeletal: normal range of motion, no joint swelling, deformity or tenderness  Neurologic: reflexes normal and symmetric, no cranial nerve deficit, gait, coordination and speech normal      Imaging:  XR CHEST PORTABLE    Result Date: 11/10/2022  EXAMINATION: ONE XRAY VIEW OF THE CHEST 11/10/2022 10:10 am COMPARISON: None.  HISTORY: ORDERING SYSTEM PROVIDED HISTORY: chest pain TECHNOLOGIST PROVIDED HISTORY: Reason for exam:->chest pain FINDINGS: A single portable AP view of the chest was obtained. Heart, mediastinum, and pulmonary vasculature are within normal limits. Lungs and pleural spaces are clear. No active cardiopulmonary disease. CTA PULMONARY W CONTRAST    Result Date: 11/10/2022  EXAMINATION: CTA OF THE CHEST 11/10/2022 11:58 am TECHNIQUE: CTA of the chest was performed after the administration of intravenous contrast.  Multiplanar reformatted images are provided for review. MIP images are provided for review. Automated exposure control, iterative reconstruction, and/or weight based adjustment of the mA/kV was utilized to reduce the radiation dose to as low as reasonably achievable. COMPARISON: None. HISTORY: ORDERING SYSTEM PROVIDED HISTORY: Elevated dimer, CP TECHNOLOGIST PROVIDED HISTORY: Reason for exam:->Elevated dimer, CP Decision Support Exception - unselect if not a suspected or confirmed emergency medical condition->Emergency Medical Condition (MA) FINDINGS: There appears to be a filling defect in the more distal aspect of the right upper anterior segment pulmonary artery proximal to its bifurcation as seen on images: Sagittal A5/76-77, sagittal A3/49-51, coronal A4/90-91, axial A9/89. Acute pulmonary embolus to a distal segmental artery of the right upper lobe is considered. There is no indication for other areas of acute pulmonary embolus in the right lung pulmonary artery circulation or in the left lung pulmonary artery circulation or in the central pulmonary arteries. Some streak artifacts from the high dense contrast in the inferior vena cava is seen in the right main pulmonary artery. The contrast density was target to evaluate the pulmonary artery circulation. There was minimal contrast in the thoracic aorta. There is no aneurysm formation or dissection of the thoracic aorta. Heart is normal size.   The LV inner diameter 4.5 cm. RV inner diameter 3.9 cm. There is no pericardial effusion. No mediastinal masses or adenopathy are identified the. Lungs are normally expanded. No infiltrates, consolidations or pleural effusions observed. Small area of subpleural air trapping seen in the posterior aspect of the right lower. Upper abdominal structures not fully covered on this study. The what is visualized appear unremarkable. Mild spondylosis are seen in the spine. 1.  Focal filling defect in the distal right upper lobe anterior segmental artery compatible an acute pulmonary embolus. 2.  No aneurysm formation or dissection the thoracic aorta. 3.  No acute pulmonary process. Preliminary report given to Dr. Griffin [facility] physician in [facility] at the time of the interpretation. RECOMMENDATIONS: Unavailable     US DUP LOWER EXTREMITIES BILATERAL VENOUS    Result Date: 11/10/2022  EXAMINATION: DUPLEX VENOUS ULTRASOUND OF THE BILATERAL LOWER [id number]/10/2022 2:58 pm TECHNIQUE: Duplex ultrasound using B-mode/gray scaled imaging, Doppler spectral analysis and color flow Doppler was obtained of the deep venous structures of the lower bilateral extremities. COMPARISON: None. HISTORY: ORDERING SYSTEM PROVIDED HISTORY: PE, rule out DVT TECHNOLOGIST PROVIDED HISTORY: Reason for exam:->PE, rule out DVT What reading provider will be dictating this exam?->CRC FINDINGS: The visualized veins of the bilateral lower extremities are patent and free of echogenic thrombus. The veins demonstrate good compressibility with normal color flow study and spectral analysis. No evidence of DVT in either lower extremity.  RECOMMENDATIONS: Unavailable         Patient Instructions:   Current Discharge Medication List        START taking these medications    Details   !! apixaban (ELIQUIS) 5 MG TABS tablet Take 2 tablets by mouth 2 times daily for 14 doses  Qty: 28 tablet, Refills: 0      !! apixaban (ELIQUIS) 5 MG TABS tablet Take 1 tablet by mouth 2 times daily  Qty: 60 tablet, Refills: 0       !! - Potential duplicate medications found. Please discuss with provider. CONTINUE these medications which have NOT CHANGED    Details   medical marijuana Take by mouth as needed. 45 minutes or more spent reviewing patient chart, assessing patient, discussing plan of care with patient and family, discussing plan of care with collaborating physician, and documentation. NOTE: This report was transcribed using voice recognition software. Every effort was made to ensure accuracy; however, inadvertent computerized transcription errors may be present.      Signed:  Electronically signed by CASEY Mari NP on 11/11/2022 at 3:06 PM

## 2022-11-14 LAB
MTHFR BY PCR SPECIMEN: NORMAL
MTHFR INTERPRETATION: NORMAL
MTHFR MUTATION A1298C: NORMAL
MTHFR MUTATION C677T: NORMAL

## 2022-11-15 LAB
PLASMINOGEN ACT INHIBITOR-1: ABNORMAL
PLASMINOGEN ACT. INHIBITOR-1 (PAI-1) SPECIMEN: ABNORMAL

## 2022-11-16 LAB
FACTOR V LEIDEN: NEGATIVE
SPECIMEN: NORMAL

## 2022-11-17 LAB
PROTHROMBIN G20210A MUTATION: NEGATIVE
PT PCR SPECIMEN: NORMAL